# Patient Record
(demographics unavailable — no encounter records)

---

## 2025-02-20 NOTE — HISTORY OF PRESENT ILLNESS
[FreeTextEntry1] : establish care [de-identified] : Patient is a 57F with PMHx of HTN and depression, undiagnosed schizophrenia here in clinic to [facility] the [facility]. Patient recently placed at [facility], stating it has been an adjustment. Comprehensive assessment performed prior to intake where she had routine bloodwork done, TB and HIV testing (negative) Recently went to [place] for foreign body sensation in vaginal canal. Pelvic exam performed without visualization of foreign bodies or masses. Patient was given polyethylene glycol for constipation and it has improved. Denies itching, burning, bleeding or mucous.  Patient states she suffers from amnesia and often feels childlike, she also discusses abrupt separation from daughter with grandiose personal and Taoism beliefs.  Patient also endorses dizziness and nausea recently, attributing it to elevated blood pressure, per patient she was last on lisinopril 15mg BID medication in 2009.  .  Prior address: [address], [place] [address].

## 2025-02-20 NOTE — END OF VISIT
[] : Resident [FreeTextEntry3] :  57 year old F presenting to [facility] care. Was recently seen at [facility] for sensation of swelling/closing of her vagina. Reportedly normal pelvic exam, has outpt GYN follow up scheduled.  Describes being from a political family from [place], believes several family members have been kidnapped, reports amnesia about her childhood. On exam, well appearing. Speech is tangential, thought content notable for grandiose delusions. Reviewed recent bloodwork from January 2025 performed at assessment shelter, all normal. Will start lisinopril for HTN, she has been on this in the past, requests to resume it. Reports taking it twice daily before, but we advised typically dosing of once per day. F/u with psych NP in shelter. RTC 2 weeks for BP check, will need to send for repeat chem panel at that time.

## 2025-02-20 NOTE — PHYSICAL EXAM
[No Acute Distress] : no acute distress [Well Nourished] : well nourished [Well Developed] : well developed [Well-Appearing] : well-appearing [Normal Sclera/Conjunctiva] : normal sclera/conjunctiva [PERRL] : pupils equal round and reactive to light [EOMI] : extraocular movements intact [Normal Outer Ear/Nose] : the outer ears and nose were normal in appearance [Normal Oropharynx] : the oropharynx was normal [No JVD] : no jugular venous distention [No Lymphadenopathy] : no lymphadenopathy [Supple] : supple [Thyroid Normal, No Nodules] : the thyroid was normal and there were no nodules present [No Respiratory Distress] : no respiratory distress  [No Accessory Muscle Use] : no accessory muscle use [Clear to Auscultation] : lungs were clear to auscultation bilaterally [Normal Rate] : normal rate  [Regular Rhythm] : with a regular rhythm [Normal S1, S2] : normal S1 and S2 [No Murmur] : no murmur heard [No Carotid Bruits] : no carotid bruits [No Abdominal Bruit] : a ~M bruit was not heard ~T in the abdomen [No Varicosities] : no varicosities [Pedal Pulses Present] : the pedal pulses are present [No Edema] : there was no peripheral edema [No Palpable Aorta] : no palpable aorta [No Extremity Clubbing/Cyanosis] : no extremity clubbing/cyanosis [Soft] : abdomen soft [Non Tender] : non-tender [Non-distended] : non-distended [No Masses] : no abdominal mass palpated [No HSM] : no HSM [Normal Bowel Sounds] : normal bowel sounds [Normal Posterior Cervical Nodes] : no posterior cervical lymphadenopathy [Normal Anterior Cervical Nodes] : no anterior cervical lymphadenopathy [No CVA Tenderness] : no CVA  tenderness [No Spinal Tenderness] : no spinal tenderness [No Joint Swelling] : no joint swelling [Grossly Normal Strength/Tone] : grossly normal strength/tone [No Rash] : no rash [Coordination Grossly Intact] : coordination grossly intact [No Focal Deficits] : no focal deficits [Normal Gait] : normal gait [Deep Tendon Reflexes (DTR)] : deep tendon reflexes were 2+ and symmetric [Alert and Oriented x3] : oriented to person, place, and time [de-identified] : Disorganized speech

## 2025-02-20 NOTE — END OF VISIT
[] : Resident [FreeTextEntry3] :  57 year old F presenting to [facility] care. Was recently seen at [facility] for sensation of swelling/closing of her vagina. Reportedly normal pelvic exam, has outpt GYN follow up scheduled.  Describes being from a political family from [place], believes several family members have been kidnapped, reports amnesia about her childhood. On exam, well appearing. Speech is tangential, thought content notable for grandiose delusions. Reviewed recent bloodwork from January 2025 performed at assessment shelter, all normal. Will start lisinopril for HTN, she has been on this in the past, requests to resume it. Reports taking it twice daily before, but we advised typically dosing of once per day. F/u with psych NP in shelter. RTC 2 weeks for BP check, will need to send for repeat chem panel at that time.

## 2025-02-20 NOTE — PHYSICAL EXAM
[No Acute Distress] : no acute distress [Well Nourished] : well nourished [Well Developed] : well developed [Well-Appearing] : well-appearing [Normal Sclera/Conjunctiva] : normal sclera/conjunctiva [PERRL] : pupils equal round and reactive to light [EOMI] : extraocular movements intact [Normal Outer Ear/Nose] : the outer ears and nose were normal in appearance [Normal Oropharynx] : the oropharynx was normal [No JVD] : no jugular venous distention [No Lymphadenopathy] : no lymphadenopathy [Supple] : supple [Thyroid Normal, No Nodules] : the thyroid was normal and there were no nodules present [No Respiratory Distress] : no respiratory distress  [No Accessory Muscle Use] : no accessory muscle use [Clear to Auscultation] : lungs were clear to auscultation bilaterally [Normal Rate] : normal rate  [Regular Rhythm] : with a regular rhythm [Normal S1, S2] : normal S1 and S2 [No Murmur] : no murmur heard [No Carotid Bruits] : no carotid bruits [No Abdominal Bruit] : a ~M bruit was not heard ~T in the abdomen [No Varicosities] : no varicosities [Pedal Pulses Present] : the pedal pulses are present [No Edema] : there was no peripheral edema [No Palpable Aorta] : no palpable aorta [No Extremity Clubbing/Cyanosis] : no extremity clubbing/cyanosis [Soft] : abdomen soft [Non Tender] : non-tender [Non-distended] : non-distended [No Masses] : no abdominal mass palpated [No HSM] : no HSM [Normal Bowel Sounds] : normal bowel sounds [Normal Posterior Cervical Nodes] : no posterior cervical lymphadenopathy [Normal Anterior Cervical Nodes] : no anterior cervical lymphadenopathy [No CVA Tenderness] : no CVA  tenderness [No Spinal Tenderness] : no spinal tenderness [No Joint Swelling] : no joint swelling [Grossly Normal Strength/Tone] : grossly normal strength/tone [No Rash] : no rash [Coordination Grossly Intact] : coordination grossly intact [No Focal Deficits] : no focal deficits [Normal Gait] : normal gait [Deep Tendon Reflexes (DTR)] : deep tendon reflexes were 2+ and symmetric [Alert and Oriented x3] : oriented to person, place, and time [de-identified] : Disorganized speech

## 2025-02-20 NOTE — HISTORY OF PRESENT ILLNESS
[FreeTextEntry1] : establish care [de-identified] : Patient is a 57F with PMHx of HTN and depression, undiagnosed schizophrenia here in clinic to establish care at the [facility] shelter. Patient recently placed at [facility], stating it has been an adjustment. Comprehensive assessment performed prior to intake where she had routine bloodwork done, TB and HIV testing (negative) Recently went to [place] for foreign body sensation in vaginal canal. Pelvic exam performed without visualization of foreign bodies or masses. Patient was given polyethylene glycol for constipation and it has improved. Denies itching, burning, bleeding or mucous.  Patient states she suffers from amnesia and often feels childlike, she also discusses abrupt separation from daughter with grandiose personal and Quaker beliefs.  Patient also endorses dizziness and nausea recently, attributing it to elevated blood pressure, per patient she was last on lisinopril 15mg BID medication in 2009.  .  Prior address: [address], [place] [address].

## 2025-03-06 NOTE — END OF VISIT
[] : Resident [FreeTextEntry3] : 57-year-old with hypertension and schizophrenia presenting to shoulder clinic for follow-up.  She has not yet started lisinopril which was prescribed at last visit because she has not picked it up from the pharmacy.  She does not have an active insurance number on file but provides us with a sticky note with a Medicare number on it, unclear if this is active.  We will send lisinopril to [facility] pharmacy under [facility] funding for 1 month until her  Felicia Vargas can clarify her insurance status.  She has ongoing vaginal complaints and psychotic symptoms.  Referred to gynecology, advised that she establish in the [facility] as she does not appear to have active insurance.  On exam, blood pressure is elevated, thought content is unusual, she makes statements like," I am a Hope lolita spider, I have the hope lolita here" (pointing to lower abd).  Return to clinic in 2 to 3 weeks for follow-up of hypertension and GYN issues or earlier as needed.

## 2025-03-06 NOTE — PHYSICAL EXAM
[Normal] : normal rate, regular rhythm, normal S1 and S2 and no murmur heard [Soft] : abdomen soft [Non Tender] : non-tender [Normal Bowel Sounds] : normal bowel sounds [de-identified] : mildly distended, no obvious masses

## 2025-03-06 NOTE — HISTORY OF PRESENT ILLNESS
[FreeTextEntry1] : abdominal swelling [de-identified] :  57F with PMHx of HTN and depression, undiagnosed schizophrenia here in clinic notes abdominal swelling. Reports hysterectomy in 2000 and was left with one ovary. She never had a period again. She notes it feels there is "a ball" in her belly. She notes occasional early satiety. She notes nausea from observing certain textures or smells of food. She normally likes pasta but nauseous at the site of spaghetti today. She notes wheat allergy and attributed her nausea to that. She has also notes urinary frequency. She notes intermittent vaginal bulge where she could not insert her fingers. She tried to have a bowel movement with no relief. She notes she had a pap smear 3 months ago and noted resistance and discomfort with the speculum. She notes she will have a bowel movement every other day or every 2 days. She was advised to take a laxative but notes it makes her nauseous and she does not think she is constipated. Having tangential and disorganized speech discussing feeling like the bulge is not medical and a spiritual being that is closely related to her

## 2025-03-06 NOTE — REVIEW OF SYSTEMS
[Hot Flashes] : hot flashes [Nausea] : nausea [Frequency] : frequency [FreeTextEntry7] : abdominal distension [FreeTextEntry8] : occasional dysuria